# Patient Record
Sex: FEMALE | Race: OTHER | Employment: FULL TIME | URBAN - METROPOLITAN AREA
[De-identification: names, ages, dates, MRNs, and addresses within clinical notes are randomized per-mention and may not be internally consistent; named-entity substitution may affect disease eponyms.]

---

## 2022-07-10 ENCOUNTER — HOSPITAL ENCOUNTER (EMERGENCY)
Age: 52
Discharge: HOME | End: 2022-07-10
Attending: EMERGENCY MEDICINE
Payer: MEDICARE

## 2022-07-10 VITALS
HEART RATE: 78 BPM | HEIGHT: 64 IN | BODY MASS INDEX: 23.05 KG/M2 | OXYGEN SATURATION: 96 % | DIASTOLIC BLOOD PRESSURE: 83 MMHG | SYSTOLIC BLOOD PRESSURE: 148 MMHG | RESPIRATION RATE: 16 BRPM | WEIGHT: 135 LBS | TEMPERATURE: 97.5 F

## 2022-07-10 DIAGNOSIS — U07.1 COVID-19: Primary | ICD-10-CM

## 2022-07-10 DIAGNOSIS — J06.9 UPPER RESPIRATORY TRACT INFECTION DUE TO COVID-19 VIRUS: ICD-10-CM

## 2022-07-10 DIAGNOSIS — U07.1 UPPER RESPIRATORY TRACT INFECTION DUE TO COVID-19 VIRUS: ICD-10-CM

## 2022-07-10 PROCEDURE — 99283 EMERGENCY DEPT VISIT LOW MDM: CPT

## 2022-07-10 RX ORDER — ASCORBIC ACID 500 MG
500 TABLET ORAL 2 TIMES DAILY
Qty: 14 TABLET | Refills: 0 | Status: SHIPPED | OUTPATIENT
Start: 2022-07-10 | End: 2022-07-17

## 2022-07-10 RX ORDER — AZITHROMYCIN 250 MG/1
TABLET, FILM COATED ORAL
Qty: 6 TABLET | Refills: 0 | Status: SHIPPED | OUTPATIENT
Start: 2022-07-10 | End: 2022-07-15

## 2022-07-10 RX ORDER — GUAIFENESIN 600 MG/1
600 TABLET, EXTENDED RELEASE ORAL 2 TIMES DAILY
Qty: 20 TABLET | Refills: 0 | Status: SHIPPED | OUTPATIENT
Start: 2022-07-10 | End: 2022-07-20

## 2022-07-10 RX ORDER — ALBUTEROL SULFATE 90 UG/1
2 AEROSOL, METERED RESPIRATORY (INHALATION) 4 TIMES DAILY PRN
Qty: 18 G | Refills: 0 | Status: SHIPPED | OUTPATIENT
Start: 2022-07-10

## 2022-07-10 RX ORDER — ZINC SULFATE 50(220)MG
50 CAPSULE ORAL DAILY
Qty: 7 CAPSULE | Refills: 0 | Status: SHIPPED | OUTPATIENT
Start: 2022-07-10 | End: 2022-07-17

## 2022-07-10 RX ORDER — LANOLIN ALCOHOL/MO/W.PET/CERES
3 CREAM (GRAM) TOPICAL DAILY
Qty: 14 TABLET | Refills: 0 | Status: SHIPPED | OUTPATIENT
Start: 2022-07-10 | End: 2022-07-24

## 2022-07-10 NOTE — Clinical Note
Jennifer Freeman was seen and treated in our emergency department on 7/10/2022. She may return to work on 07/18/2022. If you have any questions or concerns, please don't hesitate to call.       Roma Davis MD

## 2022-07-11 NOTE — ED PROVIDER NOTES
Emergency Department Encounter  [address]     Patient: Lavinia Ch  MRN: [id number]  : 1970  Date of Evaluation: 7/10/2022  ED Provider: Paco Theodore MD    Chief Complaint       Chief Complaint   Patient presents with    Concern For Mckenzienaif Perez is a 45 y.o. female who presents to the emergency department for evaluation of cough rhinorrhea sore throat generalized malaise and mild nausea. Patient reports been usual state of health until 3 days ago when symptoms started. She states that she has a family member who tested positive for COVID. She is fully immunized. Has been taking Tylenol for her symptoms. She is concerned that she might have COVID as well. Patient denies vomiting or diarrhea no abdominal pain chest pain back pain headache. Able to tolerate p.o. without any difficulty. ROS:     At least 10 systems reviewed and otherwise acutely negative except as in the [address] Past History   History reviewed. No pertinent past medical history. History reviewed. No pertinent surgical history. Social History     Socioeconomic History    Marital status:      Spouse name: None    Number of children: None    Years of education: None    Highest education level: None   Occupational History    None   Tobacco Use    Smoking status: Never Smoker    Smokeless tobacco: Never Used   Substance and Sexual Activity    Alcohol use: Never    Drug use: Never    Sexual activity: None   Other Topics Concern    None   Social History Narrative    None     Social Determinants of Health     Financial Resource Strain:     Difficulty of Paying Living Expenses: Not on file   Food Insecurity:     Worried About Running Out of Food in the Last Year: Not on file    Kymberly of Food in the Last Year: Not on file   Transportation Needs:     Lack of Transportation (Medical): Not on file    Lack of Transportation (Non-Medical):  Not on file   Physical Activity:     Days of Exercise per Week: Not on file    Minutes of Exercise per Session: Not on file   Stress:     Feeling of Stress : Not on file   Social Connections:     Frequency of Communication with Friends and Family: Not on file    Frequency of Social Gatherings with Friends and Family: Not on file    Attends Confucianism Services: Not on file    Active Member of [address] [place] [address] [employer] or Organizations: Not on file    Attends Club or Organization Meetings: Not on file    Marital Status: Not on file   Intimate Partner Violence:     Fear of Current or Ex-Partner: Not on file    Emotionally Abused: Not on file    Physically Abused: Not on file    Sexually Abused: Not on file   Housing Stability:     Unable to Pay for Housing in the Last Year: Not on file    Number of [place] in the Last Year: Not on file    Unstable Housing in the Last Year: Not on file       Medications/Allergies     Previous Medications    No medications on file     Allergies   Allergen Reactions    Asa [Aspirin] Nausea And Vomiting        Physical Exam       ED Triage Vitals [07/10/22 1901]   BP Temp Temp src Heart Rate Resp SpO2 Height Weight   (!) 148/83 97.5 °F (36.4 °C) -- 78 16 96 % 5' 4\" (1.626 m) 135 lb (61.2 kg)     GENERAL APPEARANCE: Awake and alert. Cooperative. No acute distress. HEAD: Normocephalic. Atraumatic. EYES: Sclera anicteric. Pupils equal round reactive to light extraocular movements are intact  ENT: Tolerates saliva. No trismus. Moist mucous membranes  NECK: Supple. Trachea midline. No meningismus  CARDIO: RRR. Radial pulse 2+. No murmurs rubs or gallops appreciated  LUNGS: Respirations unlabored. CTAB. No accessory muscle usage noted. No wheezes rales rhonchi or stridor. Occasional nonproductive cough noted  ABDOMEN: Soft. Non-distended. Non-tender. No tenderness in right upper quadrant or right lower quadrant to deep palpation  EXTREMITIES: No acute deformities.   No unilateral leg swelling or tenderness behind either one of calves  SKIN: Warm and dry. No erythema edema or rashes appreciated  NEUROLOGICAL:  Cranial nerves II through XII grossly intact. No gross facial drooping. Moves all 4 extremities spontaneously. PSYCHIATRIC: Normal mood. Alert and oriented x3. Diagnostics   Labs:  No results found for this visit on 07/10/22. Radiographs:  No results found. Procedures/EKG:       ED Course and MDM   In brief, Naz Genao is a 45 y.o. female who presented to the emergency department for evaluation of cough myalgia sore throat and sinus congestion. Based on patient's history and physical does seem most consistent with COVID-19 infection. Patient is clinically well-appearing in no acute distress or discomfort. She is not tachycardic tachypneic nor hypoxemic no evidence of sepsis syndrome at this time. Given patient symptomology as well as her exposure to a family member this time seem most consistent with a COVID-19 infection. I advised the patient that even if we were to test her and came back negative given her constellation of symptoms as well as the prevalence of COVID in the area and her positive exposure with a close family member I would still diagnose her with COVID-19. I did advise her of some medication that will help out with her symptoms. Advised her that she should assume that she is COVID-positive. Return to the emergency department for chest pain, shortness of breath, inability finding by mouth or any other concerning symptoms    ED Medication Orders (From admission, onward)    None          Final Impression      1. COVID-19    2.  Upper respiratory tract infection due to COVID-19 virus      DISPOSITION Decision To Discharge 07/10/2022 08:13:19 PM         (Please note that portions of this note may have been completed with a voice recognition program. Efforts were made to edit the dictations but occasionally words are mis-transcribed.)    Fernando Morocho MD  [id number] [facility] Amber Ford MD  07/10/22 2016